# Patient Record
(demographics unavailable — no encounter records)

---

## 2024-11-08 NOTE — SOCIAL HISTORY
[TextEntry] : COVID history TB history Denies major psychiatric history. Occupation: Patient lives with family/alone, with home aid

## 2024-11-08 NOTE — DATA REVIEWED
[FreeTextEntry1] : CT abdomen/Pelvis:  -Cholelithiasis without evidence of acute cholecystitis.  -Left adrenal adenoma.  -Mildly nodular hepatic contour suggests possible cirrhosis.

## 2024-11-12 NOTE — CONSULT LETTER
[Dear  ___] : Dear  [unfilled], [Consult Letter:] : I had the pleasure of evaluating your patient, [unfilled]. [Please see my note below.] : Please see my note below. [Consult Closing:] : Thank you very much for allowing me to participate in the care of this patient.  If you have any questions, please do not hesitate to contact me. [Sincerely,] : Sincerely, [FreeTextEntry3] : Nelson Mccarthy MD Professor, Cardiovascular & Thoracic Surgery [facility] [employer] Director of the [facility]  Director of Thoracic Surgery, [address] [place] [address] Phone: [phone] Fax: [phone]

## 2024-11-12 NOTE — ASSESSMENT
[FreeTextEntry1] : KHUSHI MAURO is an 88-year-old M, never a smoker, with PMHx of HTN, HFpEF, Afib (on warfarin), who is referred by Dr. Ricardo Arce for an initial evaluation of incidental found echogenic masses adherent to the visceral pericardium. He is accompanied by spouse.   Patient was admitted to [facility] 10/07-10/12/2024 due to dyspnea and hypoxia (70 % at room air).  Was found to have pulmonary edema, bilateral large pleural effusions, superimposed PNA, advanced CKD, and severe aortic stenosis.   Per note, inpatient TTE on 10/08/2024 showed echogenic masses adherent to the visceral pericardium.  Additional finding includes a moderate pericardial effusion and moderate to severe aortic stenosis (may need an aortic valve replacement).  Inpatient thoracentesis on 10/09 with 1.5 L removed. Pleural fluid was transudative, cytology pending at the time of discharge. CT image is not available for review at this time.   I have independently reviewed the medical records at the time of this office consultation, and discussed the following interpretations with the patient and his family: - the etiology of echogenic masses and pleural effusion is unclear at this time. He denies shortness of breath at rest. I would like to further evaluate these with a PET/CT scan and cardiac MRI. We will obtain the CT image from [facility] meanwhile. Will see the patient back after obtaining above images to discuss the plan of care.  - patient should follow up with cardiologist for aortic stenosis.    Patient was advised to contact us should any concerning symptoms arise. Patient verbally understood and agreed with the plan.  Plan: - PET/CT - Cardiac MRI - obtain CT CAP images and thoracentesis cytology results from [facility] after above  I, Dr. Nelson Mccarthy MD, personally performed the evaluation and management (E/M) services for this new patient. That E/M includes conducting the clinically appropriate initial history &/or exam, assessing all conditions, and establishing the plan of care. Today, my NP, Sue Suarez, was here to observe &/or participate in the visit & follow plan of care established by me.

## 2024-11-12 NOTE — CONSULT LETTER
[Dear  ___] : Dear  [unfilled], [Consult Letter:] : I had the pleasure of evaluating your patient, [unfilled]. [Please see my note below.] : Please see my note below. [Consult Closing:] : Thank you very much for allowing me to participate in the care of this patient.  If you have any questions, please do not hesitate to contact me. [Sincerely,] : Sincerely, [FreeTextEntry3] : Nelson Mccarthy MD Professor, Cardiovascular & Thoracic Surgery [facility] [employer] Director of the [facility]  Director of Thoracic Surgery, [address] [place] [address] Phone: [phone] Fax: [phone]

## 2024-11-12 NOTE — ASSESSMENT
[FreeTextEntry1] : KHUSHI MAURO is an 88-year-old M, never a smoker, with PMHx of HTN, HFpEF, Afib (on warfarin), who is referred by Dr. Ricardo Arce for an initial evaluation of incidental found echogenic masses adherent to the visceral pericardium. He is accompanied by spouse.   Patient was admitted to [facility] 10/07-10/12/2024 due to dyspnea and hypoxia (70 % at room air).  Was found to have pulmonary edema, bilateral large pleural effusions, superimposed PNA, advanced CKD, and severe aortic stenosis.   Per note, inpatient TTE on 10/08/2024 showed echogenic masses adherent to the visceral pericardium.  Additional finding includes a moderate pericardial effusion and moderate to severe aortic stenosis (may need an aortic valve replacement).  Inpatient thoracentesis on 10/09 with 1.5 L removed. Pleural fluid was transudative, cytology pending at the time of discharge. CT image is not available for review at this time.   I have independently reviewed the medical records at the time of this office consultation, and discussed the following interpretations with the patient and his family: - the etiology of echogenic masses and pleural effusion is unclear at this time. He denies shortness of breath at rest. I would like to further evaluate these with a PET/CT scan and cardiac MRI. We will obtain the CT image from [facility] meanwhile. Will see the patient back after obtaining above images to discuss the plan of care.  - patient should follow up with cardiologist for aortic stenosis.    Patient was advised to contact us should any concerning symptoms arise. Patient verbally understood and agreed with the plan.  Plan: - PET/CT - Cardiac MRI - obtain CT CAP images and thoracentesis cytology results from [facility] after above  I, Dr. Nelson Mccarthy MD, personally performed the evaluation and management (E/M) services for this new patient. That E/M includes conducting the clinically appropriate initial history &/or exam, assessing all conditions, and establishing the plan of care. Today, my NP, Sue Suarez, was here to observe &/or participate in the visit & follow plan of care established by me.

## 2024-11-12 NOTE — CONSULT LETTER
[Dear  ___] : Dear  [unfilled], [Consult Letter:] : I had the pleasure of evaluating your patient, [unfilled]. [Please see my note below.] : Please see my note below. [Consult Closing:] : Thank you very much for allowing me to participate in the care of this patient.  If you have any questions, please do not hesitate to contact me. [Sincerely,] : Sincerely, [FreeTextEntry3] : Nelson Mccarthy MD Professor, Cardiovascular & Thoracic Surgery [facility] [employer] Director of the [facility]  Director of Thoracic Surgery, [address] [place] [address] Phone: [phone] Fax: [phone]

## 2024-11-12 NOTE — HISTORY OF PRESENT ILLNESS
[FreeTextEntry1] : KHUSHI MAURO is an 88-year-old M, never a smoker, with PMHx of HTN, HFpEF, Afib (on Eliquis), who is referred by Dr. Ricardo Arce for an initial evaluation of incidental found echogenic masses adherent to the visceral pericardium. He is accompanied by spouse.   Patient was admitted to [facility] 10/07-10/12/2024 due to dyspnea and hypoxia (70 % at room air).  Was found to have pulmonary edema, bilateral large pleural effusions, superimposed PNA, advanced CKD, and severe aortic stenosis.   Per note, inpatient TTE on 10/08/2024 showed echogenic masses adherent to the visceral pericardium.  Additional finding includes a moderate pericardial effusion and moderate to severe aortic stenosis (may need an aortic valve replacement).  Inpatient thoracentesis on 10/09 with 1.5 L removed. Pleural fluid was transudative, cytology pending at the time of discharge.  CT C/A/P on 10/10/24 to evaluate for occult malignancy revealed bilateral pleura effusions, focal patchy GGNs, and multiple small and mildly enlarged mediastinal LNs, cirrhosis. In discussion with pulmonology and cardiology, unclear if lung malignancy still a possibility or if this could be due to AS, though cardiology thought that degree of pulmonary edema and pleural effusions out of proportion to what would be expected with AS.   Patient is in wheelchair. Admits to SOB on mild exertion. He denies dizziness, chest pain, palpitations, cough, or dyspnea. He is following cardiologist Dr. Ruel Morton for aortic stenosis.    CT CAP without IV contrast on 10/10/2024 -Cardiomegaly with moderate pericardial effusion. Severe aortic valve calcifications.  -Findings in the lung compatible with pulmonary edema/fluid overload, with large right and moderate left non-loculated pleural effusions, and bilateral atelectasis.  -More focal patchy ground-glass and consolidation in the inferior lingula and basilar left lower lobe, may be related to edema and/or pneumonia or aspiration.  -Multiple small and mildly enlarged mediastinal lymph nodes are nonspecific, favored to be reactive in the setting of pulmonary edema.  -Coronary atherosclerosis.  - Cholelithiasis without evidence of acute cholecystitis - Left adrenal adenoma - Mildly nodular hepatic contour suggests possible cirrhosis.

## 2024-11-12 NOTE — CONSULT LETTER
[Dear  ___] : Dear  [unfilled], [Consult Letter:] : I had the pleasure of evaluating your patient, [unfilled]. [Please see my note below.] : Please see my note below. [Consult Closing:] : Thank you very much for allowing me to participate in the care of this patient.  If you have any questions, please do not hesitate to contact me. [Sincerely,] : Sincerely, [FreeTextEntry3] : Nelson Mccarthy MD Professor, Cardiovascular & Thoracic Surgery [facility] [employer] Director of the [facility]  Director of Thoracic Surgery, [address] [place] [address] Phone: [phone] Fax: [phone]

## 2024-11-12 NOTE — REVIEW OF SYSTEMS
[Feeling Tired] : feeling tired [Wheezing] : no wheezing [Cough] : no cough [SOB on Exertion] : shortness of breath during exertion [Arthralgias] : arthralgias [Negative] : Psychiatric

## 2024-11-12 NOTE — ASSESSMENT
[FreeTextEntry1] : KHUSHI MAURO is an 88-year-old M, never a smoker, with PMHx of HTN, HFpEF, Afib (on warfarin), who is referred by Dr. Ricardo Arce for an initial evaluation of incidental found echogenic masses adherent to the visceral pericardium. He is accompanied by spouse.   Patient was admitted to [facility] 10/07-10/12/2024 due to dyspnea and hypoxia (70 % at room air).  Was found to have pulmonary edema, bilateral large pleural effusions, superimposed PNA, advanced CKD, and severe aortic stenosis.   Per note, inpatient TTE on 10/08/2024 showed echogenic masses adherent to the visceral pericardium.  Additional finding includes a moderate pericardial effusion and moderate to severe aortic stenosis (may need an aortic valve replacement).  Inpatient thoracentesis on 10/09 with 1.5 L removed. Pleural fluid was transudative, cytology pending at the time of discharge. CT image is not available for review at this time.   I have independently reviewed the medical records at the time of this office consultation, and discussed the following interpretations with the patient and his family: - the etiology of echogenic masses and pleural effusion is unclear at this time. He denies shortness of breath at rest. I would like to further evaluate these with a PET/CT scan and cardiac MRI. We will obtain the CT image from [facility] meanwhile. Will see the patient back after obtaining above images to discuss the plan of care.  - patient should follow up with cardiologist for aortic stenosis.    Patient was advised to contact us should any concerning symptoms arise. Patient verbally understood and agreed with the plan.  Plan: - PET/CT - Cardiac MRI - obtain CT CAP images and thoracentesis cytology results from [facility] after above  I, Dr. Nelson Mccarthy MD, personally performed the evaluation and management (E/M) services for this new patient. That E/M includes conducting the clinically appropriate initial history &/or exam, assessing all conditions, and establishing the plan of care. Today, my NP, Sue Suarez, was here to observe &/or participate in the visit & follow plan of care established by me.

## 2024-11-12 NOTE — PHYSICAL EXAM
[Capable of only limited self care, confined to bed or chair more than 50% of waking hours] : Status 3- Capable of only limited self care, confined to bed or chair more than 50% of waking hours [General Appearance - Alert] : alert [General Appearance - In No Acute Distress] : in no acute distress [FreeTextEntry1] : pale [Neck Appearance] : the appearance of the neck was normal [Neck Cervical Mass (___cm)] : no neck mass was observed [Jugular Venous Distention Increased] : there was no jugular-venous distention [Thyroid Diffuse Enlargement] : the thyroid was not enlarged [Thyroid Nodule] : there were no palpable thyroid nodules [Decreased Breath Sounds] : decreased breath sounds [Tachycardic ___] : the heart rate was tachycardic at [unfilled] bpm [Deep Tendon Reflexes (DTR)] : deep tendon reflexes were 2+ and symmetric [Sensation] : the sensory exam was normal to light touch and pinprick [No Focal Deficits] : no focal deficits [Oriented To Time, Place, And Person] : oriented to person, place, and time [Impaired Insight] : insight and judgment were intact [Affect] : the affect was normal

## 2024-11-12 NOTE — ASSESSMENT
[FreeTextEntry1] : KHUSHI MAURO is an 88-year-old M, never a smoker, with PMHx of HTN, HFpEF, Afib (on warfarin), who is referred by Dr. Ricardo Arce for an initial evaluation of incidental found echogenic masses adherent to the visceral pericardium. He is accompanied by spouse.   Patient was admitted to [facility] 10/07-10/12/2024 due to dyspnea and hypoxia (70 % at room air).  Was found to have pulmonary edema, bilateral large pleural effusions, superimposed PNA, advanced CKD, and severe aortic stenosis.   Per note, inpatient TTE on 10/08/2024 showed echogenic masses adherent to the visceral pericardium.  Additional finding includes a moderate pericardial effusion and moderate to severe aortic stenosis (may need an aortic valve replacement).  Inpatient thoracentesis on 10/09 with 1.5 L removed. Pleural fluid was transudative, cytology pending at the time of discharge. CT image is not available for review at this time.   I have independently reviewed the medical records at the time of this office consultation, and discussed the following interpretations with the patient and his family: - the etiology of echogenic masses and pleural effusion is unclear at this time. He denies shortness of breath at rest. I would like to further evaluate these with a PET/CT scan and cardiac MRI. We will obtain the CT image from [facility] meanwhile. Will see the patient back after obtaining above images to discuss the plan of care.  - patient should follow up with cardiologist for aortic stenosis.    Patient was advised to contact us should any concerning symptoms arise. Patient verbally understood and agreed with the plan.  Plan: - PET/CT - Cardiac MRI - obtain CT CAP images and thoracentesis cytology results from [facility] after above  I, Dr. Nelson Mccarthy MD, personally performed the evaluation and management (E/M) services for this new patient. That E/M includes conducting the clinically appropriate initial history &/or exam, assessing all conditions, and establishing the plan of care. Today, my NP, Sue Suarez, was here to observe &/or participate in the visit & follow plan of care established by me.